# Patient Record
Sex: MALE | Race: WHITE | NOT HISPANIC OR LATINO | Employment: UNEMPLOYED | URBAN - NONMETROPOLITAN AREA
[De-identification: names, ages, dates, MRNs, and addresses within clinical notes are randomized per-mention and may not be internally consistent; named-entity substitution may affect disease eponyms.]

---

## 2023-05-11 ENCOUNTER — HOSPITAL ENCOUNTER (EMERGENCY)
Facility: HOSPITAL | Age: 18
Discharge: HOME | End: 2023-05-12
Payer: MEDICAID

## 2023-05-11 VITALS
HEIGHT: 70 IN | HEART RATE: 98 BPM | TEMPERATURE: 97 F | BODY MASS INDEX: 35.5 KG/M2 | DIASTOLIC BLOOD PRESSURE: 88 MMHG | WEIGHT: 248 LBS | RESPIRATION RATE: 18 BRPM | SYSTOLIC BLOOD PRESSURE: 120 MMHG

## 2023-05-11 DIAGNOSIS — S62.326A CLOSED DISPLACED FRACTURE OF SHAFT OF FIFTH METACARPAL BONE OF RIGHT HAND, INITIAL ENCOUNTER: Primary | ICD-10-CM

## 2023-05-11 PROCEDURE — 99283 EMERGENCY DEPT VISIT LOW MDM: CPT

## 2023-05-11 PROCEDURE — 29125 APPL SHORT ARM SPLINT STATIC: CPT | Mod: RT

## 2023-05-11 RX ORDER — HYDROCODONE BITARTRATE AND ACETAMINOPHEN 5; 325 MG/1; MG/1
1 TABLET ORAL EVERY 6 HOURS PRN
Qty: 12 TABLET | Refills: 0 | Status: SHIPPED | OUTPATIENT
Start: 2023-05-11

## 2023-05-11 NOTE — Clinical Note
"Adarsh Birminghamxander Zimmerman was seen and treated in our emergency department on 5/11/2023.  He may return to work on 05/15/2023.       If you have any questions or concerns, please don't hesitate to call.      Devendra Mckinney MD"

## 2023-05-12 NOTE — ED PROVIDER NOTES
Encounter Date: 5/11/2023       History     Chief Complaint   Patient presents with    Hand Injury     C/O R HAND PAIN AND SWELLING AFTER HITTING FURNITURE     17-year-old male presents complaining of right hand pain and swelling, after striking dresser in anger.    The history is provided by the patient.   Review of patient's allergies indicates:  No Known Allergies  No past medical history on file.  No past surgical history on file.  No family history on file.     Review of Systems   Musculoskeletal:         Right hand pain and swelling   All other systems reviewed and are negative.    Physical Exam     Initial Vitals [05/11/23 2324]   BP Pulse Resp Temp SpO2   120/88 98 18 97 °F (36.1 °C) --      MAP       --         Physical Exam    Nursing note and vitals reviewed.  Constitutional: Vital signs are normal. He appears well-developed and well-nourished. He is cooperative.   HENT:   Head: Normocephalic and atraumatic.   Nose: Nose normal.   Mouth/Throat: Oropharynx is clear and moist.   Eyes: Conjunctivae, EOM and lids are normal. Pupils are equal, round, and reactive to light.   Neck: Trachea normal. Neck supple.   Normal range of motion.  Cardiovascular:  Normal rate, regular rhythm, normal heart sounds and intact distal pulses.           Pulmonary/Chest: Breath sounds normal.   Musculoskeletal:      Cervical back: Normal, normal range of motion and neck supple.      Lumbar back: Normal.      Comments: And swelling over right 5th metacarpal     Neurological: He is alert and oriented to person, place, and time. He has normal strength. Coordination normal.   Skin: Skin is warm, dry and intact. Capillary refill takes less than 2 seconds.   Psychiatric: He has a normal mood and affect. His speech is normal and behavior is normal. Judgment and thought content normal. Cognition and memory are normal.       ED Course   Splint Application    Date/Time: 5/11/2023 11:11 PM  Performed by: Devendra Mckinney MD  Authorized  "by: Devendra Mckinney MD   Consent Done: Yes  Consent: Verbal consent obtained.  Risks and benefits: risks, benefits and alternatives were discussed  Consent given by: patient  Patient understanding: patient states understanding of the procedure being performed  Patient consent: the patient's understanding of the procedure matches consent given  Test results: test results available and properly labeled  Imaging studies: imaging studies available  Required items: required blood products, implants, devices, and special equipment available  Patient identity confirmed: verbally with patient  Time out: Immediately prior to procedure a "time out" was called to verify the correct patient, procedure, equipment, support staff and site/side marked as required.  Location details: right hand  Splint type: ulnar gutter  Supplies used: cotton padding, elastic bandage and Ortho-Glass  Post-procedure: The splinted body part was neurovascularly unchanged following the procedure.  Patient tolerance: Patient tolerated the procedure well with no immediate complications      Labs Reviewed - No data to display       Imaging Results              X-Ray Hand 3 view Right (Preliminary result)  Result time 05/11/23 23:43:08      Wet Read by Devendra Mckinney MD (05/11/23 23:43:08, [facility], Emergency Medicine)    5th metacarpal fracture, with mild displacement                                     Medications - No data to display  Medical Decision Making:   Initial Assessment:   Right 5th metacarpal shaft fracture  ED Management:  Splint                        Clinical Impression:   Final diagnoses:  [S62.326A] Closed displaced fracture of shaft of fifth metacarpal bone of right hand, initial encounter (Primary)        ED Disposition Condition    Discharge Good          ED Prescriptions       Medication Sig Dispense Start Date End Date Auth. Provider    HYDROcodone-acetaminophen (NORCO) 5-325 mg per tablet Take 1 " tablet by mouth every 6 (six) hours as needed for Pain. 12 tablet 5/11/2023 -- Devendra Mckinney MD          Follow-up Information       Follow up With Specialties Details Why Contact Info    PCP  Call in 1 day               Devendra Mckinney MD  05/11/23 7091

## 2023-06-07 ENCOUNTER — HOSPITAL ENCOUNTER (OUTPATIENT)
Dept: RADIOLOGY | Facility: HOSPITAL | Age: 18
Discharge: HOME | End: 2023-06-07
Attending: NURSE PRACTITIONER
Payer: MEDICAID

## 2023-06-07 DIAGNOSIS — S62.326A CLOSED DISP FRACTURE OF SHAFT OF FIFTH METACARPAL BONE OF RIGHT HAND: ICD-10-CM

## 2023-06-07 PROCEDURE — 73130 X-RAY EXAM OF HAND: CPT | Mod: TC,RT

## 2023-07-07 ENCOUNTER — HOSPITAL ENCOUNTER (OUTPATIENT)
Dept: RADIOLOGY | Facility: HOSPITAL | Age: 18
Discharge: HOME | End: 2023-07-07
Attending: ORTHOPAEDIC SURGERY
Payer: MEDICAID

## 2023-07-07 DIAGNOSIS — S62.326A CLOSED DISP FRACTURE OF SHAFT OF FIFTH METACARPAL BONE OF RIGHT HAND: ICD-10-CM

## 2023-07-07 PROCEDURE — 73120 X-RAY EXAM OF HAND: CPT | Mod: TC,RT
